# Patient Record
Sex: MALE | Race: WHITE | Employment: UNEMPLOYED | URBAN - METROPOLITAN AREA
[De-identification: names, ages, dates, MRNs, and addresses within clinical notes are randomized per-mention and may not be internally consistent; named-entity substitution may affect disease eponyms.]

---

## 2022-05-11 NOTE — PROGRESS NOTES
Jesus Corley (: 1985) is a 40 y.o. male, patient, here for evaluation of the following chief complaint(s):  Knee Pain (right)       HPI:    He was last seen for his right knee pain approximately 2 years ago. The patient states his pain level the same as was his last visit. He rates the severity of his right knee pain is a 5 out of 10. He gives no detail or description of his discomfort. He reports taking no medication for his pain. The patient has had previous but unrelated right knee surgery. No Known Allergies    No current outpatient medications on file. No current facility-administered medications for this visit. History reviewed. No pertinent past medical history. Past Surgical History:   Procedure Laterality Date    HX ORTHOPAEDIC      raquel knee reconstruction       History reviewed. No pertinent family history. Social History     Socioeconomic History    Marital status: SINGLE     Spouse name: Not on file    Number of children: Not on file    Years of education: Not on file    Highest education level: Not on file   Occupational History    Not on file   Tobacco Use    Smoking status: Never Smoker    Smokeless tobacco: Never Used   Substance and Sexual Activity    Alcohol use: Yes     Comment: occ    Drug use: No    Sexual activity: Not on file   Other Topics Concern    Not on file   Social History Narrative    Not on file     Social Determinants of Health     Financial Resource Strain:     Difficulty of Paying Living Expenses: Not on file   Food Insecurity:     Worried About Running Out of Food in the Last Year: Not on file    [place] of Food in the Last Year: Not on file   Transportation Needs:     Lack of Transportation (Medical): Not on file    Lack of Transportation (Non-Medical):  Not on file   Physical Activity:     Days of Exercise per Week: Not on file    Minutes of Exercise per Session: Not on file   Stress:     Feeling of Stress : Not on file Social Connections:     Frequency of Communication with Friends and Family: Not on file    Frequency of Social Gatherings with Friends and Family: Not on file    Attends Cheondoism Services: Not on file    Active Member of Clubs or Organizations: Not on file    Attends Club or Organization Meetings: Not on file    Marital Status: Not on file   Intimate Partner Violence:     Fear of Current or Ex-Partner: Not on file    Emotionally Abused: Not on file    Physically Abused: Not on file    Sexually Abused: Not on file   Housing Stability:     Unable to Pay for Housing in the Last Year: Not on file    Number of [place] in the Last Year: Not on file    Unstable Housing in the Last Year: Not on file       Review of Systems   All other systems reviewed and are negative. Vitals:  Ht 5' 10\" (1.778 m)   Wt 180 lb (81.6 kg)   BMI 25.83 kg/m²    Body mass index is 25.83 kg/m². Ortho Exam     The patient is well-developed and well-nourished. The patient presents today in alert and oriented x3 with a normal mood and affect. The patient stands with a normal weightbearing line but walks with a slightly antalgic gait because of his right knee pain. Right knee mild effusion on exam.  Full range of motion. No medial joint line tenderness, does have lateral joint line tenderness and small area of fluid over the lateral joint line. Stable to varus and valgus stress testing. 1+ Lachman on exam 1+ posterior drawer. Calf is soft and supple without swelling or tenderness. Sensation intact to light touch over the leg and distally in the foot. ASSESSMENT/PLAN:      1. Chronic pain of right knee  -     XR KNEE RT MIN 4 V; Future  -     MRI KNEE RT WO CONT; Future  2. Cyst of lateral meniscus of right knee  -     MRI KNEE RT WO CONT; Future  3.  Fear of falling   XR Results (most recent):  Results from Appointment encounter on 05/12/22    XR KNEE RT MIN 4 V    Narrative  4 view x-rays of the right knee show previous ACL and PCL hardware with 2 screws on the medial side of the knee and a posterior button. Joint spaces well-maintained and shows no significant osteoarthritis. Below is the assessment and plan developed based on review of pertinent history, physical exam, labs, studies, and medications. We discussed the patient's right knee pain and his signs, symptoms, physical exam, description of his pain, description of his injury, and x-rays are consistent with a lateral meniscus tear and a lateral meniscal cyst.  We talked about his activity and he is very active and is working on his own formal physical therapy program.  He has had multiple knee surgeries and has been doing his home PT and working out at [employer]. We will obtain an MRI and use that information for likely surgical intervention in the very near future. We discussed the risk and benefits of an arthroscopic exam and we will likely proceed within the near future. 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 Return After the MRI. An electronic signature was used to authenticate this note.   -- Shauna Damon MD

## 2022-05-12 ENCOUNTER — OFFICE VISIT (OUTPATIENT)
Dept: ORTHOPEDIC SURGERY | Age: 37
End: 2022-05-12
Payer: COMMERCIAL

## 2022-05-12 VITALS — HEIGHT: 70 IN | BODY MASS INDEX: 25.77 KG/M2 | WEIGHT: 180 LBS

## 2022-05-12 DIAGNOSIS — G89.29 CHRONIC PAIN OF RIGHT KNEE: Primary | ICD-10-CM

## 2022-05-12 DIAGNOSIS — M25.561 CHRONIC PAIN OF RIGHT KNEE: Primary | ICD-10-CM

## 2022-05-12 DIAGNOSIS — F40.298 FEAR OF FALLING: ICD-10-CM

## 2022-05-12 DIAGNOSIS — M23.000 CYST OF LATERAL MENISCUS OF RIGHT KNEE: ICD-10-CM

## 2022-05-12 PROCEDURE — 99213 OFFICE O/P EST LOW 20 MIN: CPT | Performed by: ORTHOPAEDIC SURGERY

## 2022-08-15 DIAGNOSIS — S83.281A TEAR OF LATERAL MENISCUS OF RIGHT KNEE, UNSPECIFIED TEAR TYPE, UNSPECIFIED WHETHER OLD OR CURRENT TEAR, INITIAL ENCOUNTER: ICD-10-CM

## 2022-08-15 DIAGNOSIS — M23.000 CYST OF LATERAL MENISCUS OF RIGHT KNEE: Primary | ICD-10-CM

## 2022-10-10 DIAGNOSIS — G89.29 CHRONIC PAIN OF RIGHT KNEE: ICD-10-CM

## 2022-10-10 DIAGNOSIS — M23.000 CYST OF LATERAL MENISCUS OF RIGHT KNEE: Primary | ICD-10-CM

## 2022-10-10 DIAGNOSIS — M25.561 CHRONIC PAIN OF RIGHT KNEE: ICD-10-CM

## 2022-11-09 NOTE — PROGRESS NOTES
Jesus Faulkner (: 1985) is a 40 y.o. male, patient, here for evaluation of the following chief complaint(s):  Knee Pain (Right, MRI results)       HPI:    He was last seen for his right knee pain on 2022. Since then, the patient did have an MRI performed on his right knee on 10/15/2022. The patient states that his pain level is worse than it was at his last visit. He rates the severity of his right knee pain as a 6 or 7 out of 10. Pain is dull, throbbing, aching, and.  His right knee pain does make it difficult for him to go to sleep and does wake him up from sleep. The patient reports taking no medication for his discomfort. He has been wearing a knee brace for comfort, stability, and support. Right knee MRI images and results were independently reviewed and they were consistent with the study being limited by susceptibility artifact from metal.  Suspected subchondral bone plate fracture in the posterior weightbearing aspect of the lateral femoral condyle. Complex tearing in diminution of the posterior horn of the lateral meniscus. ACL and PCL reconstruction with intact fibers. MCL reconstruction with intact fibers. No Known Allergies    No current outpatient medications on file. No current facility-administered medications for this visit. History reviewed. No pertinent past medical history. Past Surgical History:   Procedure Laterality Date    HX ORTHOPAEDIC      raquel knee reconstruction       History reviewed. No pertinent family history.      Social History     Socioeconomic History    Marital status: SINGLE     Spouse name: Not on file    Number of children: Not on file    Years of education: Not on file    Highest education level: Not on file   Occupational History    Not on file   Tobacco Use    Smoking status: Never    Smokeless tobacco: Never   Substance and Sexual Activity    Alcohol use: Yes     Comment: occ    Drug use: No    Sexual activity: Not on file Other Topics Concern    Not on file   Social History Narrative    Not on file     Social Determinants of Health     Financial Resource Strain: Not on file   Food Insecurity: Not on file   Transportation Needs: Not on file   Physical Activity: Not on file   Stress: Not on file   Social Connections: Not on file   Intimate Partner Violence: Not on file   Housing Stability: Not on file       Review of Systems   All other systems reviewed and are negative. Vitals:  Ht 5' 10\" (1.778 m)   Wt 180 lb (81.6 kg)   BMI 25.83 kg/m²    Body mass index is 25.83 kg/m². Ortho Exam     The patient is well-developed and well-nourished. The patient presents today in alert and oriented x3 with a normal mood and affect. The patient stands with a normal weightbearing line but walks with a slightly antalgic gait because of his right knee pain. Right knee has a mild effusion on exam.  Range of motion is relatively well-maintained. There is some slight limitation in his range of motion secondary to his discomfort. .  No medial joint line tenderness, does have significant lateral joint line tenderness and small area of fluid over the lateral joint line. Stable to varus and valgus stress testing. 1+ Lachman on exam 1+ posterior drawer. There is a positive lateral Raji's test.  Calf is soft and supple without swelling or tenderness. Sensation intact to light touch over the leg and distally in the foot. ASSESSMENT/PLAN:      1. Chronic pain of right knee  2. Cyst of lateral meniscus of right knee  3. Lateral knee pain, right  4. Knee meniscus pain, right  5. Complex tear of lateral meniscus of right knee, subsequent encounter  -     REFERRAL TO ORTHOPEDIC SURGERY     Below is the assessment and plan developed based on review of pertinent history, physical exam, labs, studies, and medications.     We discussed the patient's ongoing right knee pain and we independently reviewed his MRI images and results and they were consistent with the study being limited by susceptibility artifact from metal.  Suspected subchondral bone plate fracture in the posterior weightbearing aspect of the lateral femoral condyle. Complex tearing in diminution of the posterior horn of the lateral meniscus. ACL and PCL reconstruction with intact fibers. MCL reconstruction with intact fibers. The possible treatment options were discussed with the patient and because of the duration of his ongoing and worsening pain, no improvement with multiple modalities of conservative management including an at-home exercise program, his physical exam, description of his pain, description of his injury, past x-rays, independently reviewed MRI images and results, and his inability to complete daily living activities without significant discomfort we both decided that surgical intervention was the best treatment plan. The risks and benefits of a right knee arthroscopic exam with lateral meniscus repair were discussed in detail with the patient and he would like to proceed. We will schedule this at his convenience. In the interim, I did encourage him to ice and elevate when possible, modify his activity level based on his right knee pain, and use anti-inflammatory medication when necessary. The patient will work on range of motion, strengthening, and stretching exercises with an at-home exercise program as pain tolerates. He is to avoid any deep knee bend activities against resistance, squatting, kneeling, stairs, lunging, cutting, twisting, change of direction, and high impact loading activities. I will see him back in the office on an as-needed basis for his right knee pain on the day of his upcoming right knee surgery. Return for In the office as needed or on the day of his upcoming right knee surgery. An electronic signature was used to authenticate this note.   -- Kaelyn Manrique MD

## 2022-11-10 ENCOUNTER — OFFICE VISIT (OUTPATIENT)
Dept: ORTHOPEDIC SURGERY | Age: 37
End: 2022-11-10
Payer: COMMERCIAL

## 2022-11-10 VITALS — HEIGHT: 70 IN | WEIGHT: 180 LBS | BODY MASS INDEX: 25.77 KG/M2

## 2022-11-10 DIAGNOSIS — M25.561 KNEE MENISCUS PAIN, RIGHT: ICD-10-CM

## 2022-11-10 DIAGNOSIS — S83.271D COMPLEX TEAR OF LATERAL MENISCUS OF RIGHT KNEE, SUBSEQUENT ENCOUNTER: ICD-10-CM

## 2022-11-10 DIAGNOSIS — G89.29 CHRONIC PAIN OF RIGHT KNEE: Primary | ICD-10-CM

## 2022-11-10 DIAGNOSIS — M23.000 CYST OF LATERAL MENISCUS OF RIGHT KNEE: ICD-10-CM

## 2022-11-10 DIAGNOSIS — M25.561 LATERAL KNEE PAIN, RIGHT: ICD-10-CM

## 2022-11-10 DIAGNOSIS — M25.561 CHRONIC PAIN OF RIGHT KNEE: Primary | ICD-10-CM

## 2022-11-10 PROCEDURE — 99214 OFFICE O/P EST MOD 30 MIN: CPT | Performed by: ORTHOPAEDIC SURGERY

## 2022-11-21 DIAGNOSIS — S83.271D COMPLEX TEAR OF LATERAL MENISCUS OF RIGHT KNEE, SUBSEQUENT ENCOUNTER: Primary | ICD-10-CM

## 2022-11-21 DIAGNOSIS — M25.561 LATERAL KNEE PAIN, RIGHT: ICD-10-CM

## 2022-11-22 DIAGNOSIS — S83.271D COMPLEX TEAR OF LATERAL MENISCUS OF RIGHT KNEE, SUBSEQUENT ENCOUNTER: Primary | ICD-10-CM

## 2022-11-22 DIAGNOSIS — M25.561 LATERAL KNEE PAIN, RIGHT: ICD-10-CM

## 2022-11-22 RX ORDER — OXYCODONE AND ACETAMINOPHEN 5; 325 MG/1; MG/1
1 TABLET ORAL
Qty: 30 TABLET | Refills: 0 | Status: SHIPPED | OUTPATIENT
Start: 2022-11-22 | End: 2022-11-29

## 2022-11-22 RX ORDER — OXYCODONE AND ACETAMINOPHEN 5; 325 MG/1; MG/1
1 TABLET ORAL
Qty: 30 TABLET | Refills: 0 | Status: SHIPPED | OUTPATIENT
Start: 2022-11-22 | End: 2022-11-22 | Stop reason: CLARIF

## 2022-11-30 ENCOUNTER — OFFICE VISIT (OUTPATIENT)
Dept: ORTHOPEDIC SURGERY | Age: 37
End: 2022-11-30
Payer: COMMERCIAL

## 2022-11-30 DIAGNOSIS — S83.271D COMPLEX TEAR OF LATERAL MENISCUS OF RIGHT KNEE, SUBSEQUENT ENCOUNTER: Primary | ICD-10-CM

## 2022-11-30 DIAGNOSIS — Z09 STATUS POST ORTHOPEDIC SURGERY, FOLLOW-UP EXAM: Primary | ICD-10-CM

## 2022-11-30 RX ORDER — OXYCODONE AND ACETAMINOPHEN 5; 325 MG/1; MG/1
1 TABLET ORAL
Qty: 20 TABLET | Refills: 0 | Status: SHIPPED | OUTPATIENT
Start: 2022-11-30 | End: 2022-12-03

## 2022-11-30 NOTE — PROGRESS NOTES
Jesus Laboy (: 1985) is a 40 y.o. male, patient, here for evaluation of the following chief complaint(s):  Knee Pain (Right knee post op)       HPI:    Presents for his first postoperative visit after right knee lateral release, trochlear chondroplasty, and lateral posterior meniscus root repair. This is a patient from Dr. Haq. Surgery was 1 week ago. He still having some pain in his knee but no major complaints since surgery. He has been wearing his brace locked in extension and has been weightbearing on it. Patient denies shortness of breath or calf pain. No Known Allergies    No current outpatient medications on file. No current facility-administered medications for this visit. No past medical history on file. Past Surgical History:   Procedure Laterality Date    HX ORTHOPAEDIC      raquel knee reconstruction       No family history on file. Social History     Socioeconomic History    Marital status:      Spouse name: Not on file    Number of children: Not on file    Years of education: Not on file    Highest education level: Not on file   Occupational History    Not on file   Tobacco Use    Smoking status: Never    Smokeless tobacco: Never   Substance and Sexual Activity    Alcohol use: Yes     Comment: occ    Drug use: No    Sexual activity: Not on file   Other Topics Concern    Not on file   Social History Narrative    Not on file     Social Determinants of Health     Financial Resource Strain: Not on file   Food Insecurity: Not on file   Transportation Needs: Not on file   Physical Activity: Not on file   Stress: Not on file   Social Connections: Not on file   Intimate Partner Violence: Not on file   Housing Stability: Not on file       Review of Systems   Musculoskeletal:         Right knee      Vitals: There were no vitals taken for this visit. There is no height or weight on file to calculate BMI. Ortho Exam     Right knee: Incisions are well-healing. Sutures removed today. No surrounding erythema or drainage. Significant ecchymosis over the lateral knee. Able straight leg raise. Neurovascularly intact distally. Calf is soft and supple. ASSESSMENT/PLAN:    Patient is doing well 1 week after his right knee lateral release, trochlear abrasion arthroplasty, and lateral posterior meniscus root repair. He should continue to wear his hinged knee brace locked in extension when he is walking with his crutches. When he is sitting he can unlock his brace and begin to bend. He should work on straight leg raises and knee range of motion when at home. We will give him a refill of his pain medication today. He will follow-up in 1 month with Dr. Haq for reevaluation.         Billie Hinson MD

## 2022-11-30 NOTE — LETTER
11/30/2022    Patient: Hardeep Owen   YOB: 1985   Date of Visit: 11/30/2022     Esha Harris MD  [address] [place] [address]  [address]  [address] [id number]  Via Fax: [phone]    Dear Esha Harris MD,      Thank you for referring Mr. Jose R Granados to [facility] for evaluation. My notes for this consultation are attached. If you have questions, please do not hesitate to call me. I look forward to following your patient along with you.       Sincerely,    David Chapa MD

## 2023-01-03 ENCOUNTER — OFFICE VISIT (OUTPATIENT)
Dept: ORTHOPEDIC SURGERY | Age: 38
End: 2023-01-03
Payer: COMMERCIAL

## 2023-01-03 VITALS — BODY MASS INDEX: 25.77 KG/M2 | WEIGHT: 180 LBS | HEIGHT: 70 IN

## 2023-01-03 DIAGNOSIS — M25.461 KNEE EFFUSION, RIGHT: ICD-10-CM

## 2023-01-03 DIAGNOSIS — M25.561 POSTOPERATIVE PAIN OF RIGHT KNEE: Primary | ICD-10-CM

## 2023-01-03 DIAGNOSIS — Z98.890 STATUS POST ARTHROSCOPIC SURGERY OF RIGHT KNEE: ICD-10-CM

## 2023-01-03 DIAGNOSIS — G89.18 POSTOPERATIVE PAIN OF RIGHT KNEE: Primary | ICD-10-CM

## 2023-01-03 PROCEDURE — 20610 DRAIN/INJ JOINT/BURSA W/O US: CPT | Performed by: ORTHOPAEDIC SURGERY

## 2023-01-03 PROCEDURE — 99024 POSTOP FOLLOW-UP VISIT: CPT | Performed by: ORTHOPAEDIC SURGERY

## 2023-01-03 RX ORDER — METHYLPREDNISOLONE ACETATE 40 MG/ML
80 INJECTION, SUSPENSION INTRA-ARTICULAR; INTRALESIONAL; INTRAMUSCULAR; SOFT TISSUE ONCE
Status: COMPLETED | OUTPATIENT
Start: 2023-01-03 | End: 2023-01-03

## 2023-01-03 RX ORDER — BUPIVACAINE HYDROCHLORIDE 7.5 MG/ML
2 INJECTION, SOLUTION EPIDURAL; RETROBULBAR ONCE
Status: COMPLETED | OUTPATIENT
Start: 2023-01-03 | End: 2023-01-03

## 2023-01-03 RX ADMIN — METHYLPREDNISOLONE ACETATE 80 MG: 40 INJECTION, SUSPENSION INTRA-ARTICULAR; INTRALESIONAL; INTRAMUSCULAR; SOFT TISSUE at 14:48

## 2023-01-03 RX ADMIN — BUPIVACAINE HYDROCHLORIDE 15 MG: 7.5 INJECTION, SOLUTION EPIDURAL; RETROBULBAR at 14:48

## 2023-01-03 NOTE — PROGRESS NOTES
Jesus Rodriguez (: 1985) is a 40 y.o. male, patient, here for evaluation of the following chief complaint(s):  Knee Pain (right) and Surgical Follow-up (Sx 22)       HPI:    He is now approaching 6 weeks status post right knee arthroscopic exam with lateral release, trochlear chondroplasty, and lateral posterior meniscus root repair. His surgery was performed on 2020. He was last seen on 2022 by Dr. Sadie Gary for his first postoperative visit. The patient states that his pain level has improved since his surgical procedure. He rates the severity of his postoperative right knee pain as a 5 out of 10. He describes his pain as sharp, aching, and intermittent. He has been working on range of motion, strengthening, and stretching exercises with both formal physical therapy and with an at-home exercise program as pain tolerates. He has been taking anti-inflammatory medication for his discomfort as needed. He does present today with some postoperative swelling present. No Known Allergies    No current outpatient medications on file. No current facility-administered medications for this visit. History reviewed. No pertinent past medical history. Past Surgical History:   Procedure Laterality Date    HX ORTHOPAEDIC      raquel knee reconstruction       History reviewed. No pertinent family history.      Social History     Socioeconomic History    Marital status:      Spouse name: Not on file    Number of children: Not on file    Years of education: Not on file    Highest education level: Not on file   Occupational History    Not on file   Tobacco Use    Smoking status: Never    Smokeless tobacco: Never   Substance and Sexual Activity    Alcohol use: Yes     Comment: occ    Drug use: No    Sexual activity: Not on file   Other Topics Concern    Not on file   Social History Narrative    Not on file     Social Determinants of Health     Financial Resource Strain: Not on file Food Insecurity: Not on file   Transportation Needs: Not on file   Physical Activity: Not on file   Stress: Not on file   Social Connections: Not on file   Intimate Partner Violence: Not on file   Housing Stability: Not on file       Review of Systems   All other systems reviewed and are negative. Vitals:  Ht 5' 10\" (1.778 m)   Wt 180 lb (81.6 kg)   BMI 25.83 kg/m²    Body mass index is 25.83 kg/m². Ortho Exam     The patient is well-developed and well-nourished. The patient presents today in alert and oriented x3 with a normal mood and affect. The patient stands with a normal weightbearing line with a slightly antalgic gait because of his postoperative right knee pain. Right knee wounds clean dry neurovascular intact. There is no ecchymosis or erythema. His incisions are well-healed with no sign of irritation or infection and look normal.  He does have full extension. There is a moderate size postoperative effusion present. He has approximately 120 degrees of flexion. There is still limitation in his range of motion secondary to his postoperative discomfort and effusion. His quadricep strength is improving and he can do a seated straight leg raise without discomfort but there is still quadriceps weakness noted compared to normal.  He reports no calf tenderness. His sensations are intact his pulses are 2+. His skin is well-healed. ASSESSMENT/PLAN:      1. Postoperative pain of right knee  2.  Status post arthroscopic surgery of right knee  -     REFERRAL TO PHYSICAL THERAPY  3. Knee effusion, right  -     bupivacaine (PF) (MARCAINE) 0.75 % (7.5 mg/mL) injection 15 mg; 15 mg (2 mL), Intra artICUlar, ONCE, 1 dose, On Tue 1/3/23 at 1500  -     methylPREDNISolone acetate (DEPO-MEDROL) 40 mg/mL injection 80 mg; 80 mg, Intra artICUlar, ONCE, 1 dose, On Tue 1/3/23 at 1500     Below is the assessment and plan developed based on review of pertinent history, physical exam, labs, studies, and medications. **The patient will continue attending formal physical therapy as needed. **    We discussed the patient's right knee arthroscopic exam with lateral release, trochlear chondroplasty, and lateral posterior meniscus root repair. His surgery was performed on 11/23/2022. He is now approximately 6 weeks status postsurgical intervention. The patient continues to progress nicely in his recovery but he does present today with a moderate-sized effusion present. His range of motion and strength have improved since his last visit. He does have full extension. The possible treatment options were discussed with the patient and because of an effusion present we elected to aspirate and inject his right knee with cortisone today to try and alleviate some of his discomfort. The risks and benefits of the aspiration and injection were discussed in detail with the patient and under sterile prep the patient's right knee was aspirated of approximately 30 ccs of slightly blood-tinged synovial fluid and injected with 2 ccs of 40 mg/cc of Depo-Medrol and 2 ccs of 0.75% Sensorcaine. He tolerated both the aspiration and injection well. The patient will continue the postoperative protocol by working on range of motion, strengthening, and stretching exercises at both formal physical therapy as needed and with an at-home exercise program as pain tolerates. He may continue to increase activities as tolerated and as discussed today in the office. He is still to avoid any deep knee bend activities against resistance, squatting, kneeling, stairs, lunging, cutting, twisting, change of direction, and high impact loading activities. He may discontinue use of his postoperative knee brace at this time. I did encourage him to ice and elevate when possible, modify his activity level based on his right knee pain, monitor his effusion, and use anti-inflammatory medication when necessary.   I will see him back in 4 weeks for reevaluation and further discussion of the postoperative call. Return in about 4 weeks (around 1/31/2023) for Reevaluation and further discussion of the postoperative protocol. An electronic signature was used to authenticate this note.   -- Victor Hugo Talbot MD

## 2023-02-24 ENCOUNTER — OFFICE VISIT (OUTPATIENT)
Dept: ORTHOPEDIC SURGERY | Age: 38
End: 2023-02-24
Payer: COMMERCIAL

## 2023-02-24 DIAGNOSIS — G89.18 POSTOPERATIVE PAIN OF RIGHT KNEE: Primary | ICD-10-CM

## 2023-02-24 DIAGNOSIS — Z98.890 STATUS POST ARTHROSCOPIC SURGERY OF RIGHT KNEE: ICD-10-CM

## 2023-02-24 DIAGNOSIS — M25.561 POSTOPERATIVE PAIN OF RIGHT KNEE: Primary | ICD-10-CM

## 2023-02-25 NOTE — PROGRESS NOTES
Jesus Eldridge (: 1985) is a 40 y.o. male, patient, here for evaluation of the following chief complaint(s):  Knee Pain (right)       HPI:    He underwent a right lateral meniscus repair and lateral release with trochlear abrasion arthroplasty on 2022. He states that he is 70% better and has pain which is 2 out of 10. He does have occasional numbness and tingling around his knee but overall is happy with his progress. 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 No Known Allergies    No current outpatient medications on file. No current facility-administered medications for this visit. No past medical history on file. Past Surgical History:   Procedure Laterality Date    HX ORTHOPAEDIC      raquel knee reconstruction       No family history on file. Social History     Socioeconomic History    Marital status:      Spouse name: Not on file    Number of children: Not on file    Years of education: Not on file    Highest education level: Not on file   Occupational History    Not on file   Tobacco Use    Smoking status: Never    Smokeless tobacco: Never   Substance and Sexual Activity    Alcohol use: Yes     Comment: occ    Drug use: No    Sexual activity: Not on file   Other Topics Concern    Not on file   Social History Narrative    Not on file     Social Determinants of Health     Financial Resource Strain: Not on file   Food Insecurity: Not on file   Transportation Needs: Not on file   Physical Activity: Not on file   Stress: Not on file   Social Connections: Not on file   Intimate Partner Violence: Not on file   Housing Stability: Not on file       Review of Systems    Vitals: There were no vitals taken for this visit. There is no height or weight on file to calculate BMI. Ortho Exam     The patient is well-developed and well-nourished. The patient presents today in alert and oriented x3 with a normal mood and affect.   The patient stands with a normal weightbearing line with a slightly antalgic gait because of his postoperative right knee pain. Right knee wounds clean dry neurovascular intact. There is no ecchymosis or erythema. His incisions are well-healed with no sign of irritation or infection and look normal.  He does have full extension. There is full effusion present. He has approximately 120+ degrees of flexion. His quadricep strength is improving and he can do a seated straight leg raise without discomfort but there is still quadriceps weakness noted compared to normal.  He reports no calf tenderness. His sensations are intact his pulses are 2+. His skin is well-healed. ASSESSMENT/PLAN:      1. Postoperative pain of right knee  2. Status post arthroscopic surgery of right knee     Below is the assessment and plan developed based on review of pertinent history, physical exam, labs, studies, and medications. Talk with him in great length about his excellent progress and the need to continue to work aggressively on his quadricep strengthening program.  We encouraged him to slowly return to bent knee twisting type activities and to avoid impact loading is much as possible. 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  I will see him back in 1 month if he has any difficulties otherwise I will see him back as needed. Return if symptoms worsen or fail to improve. An electronic signature was used to authenticate this note.   -- Branda Brittle, MD